# Patient Record
Sex: FEMALE | Race: BLACK OR AFRICAN AMERICAN | NOT HISPANIC OR LATINO | URBAN - METROPOLITAN AREA
[De-identification: names, ages, dates, MRNs, and addresses within clinical notes are randomized per-mention and may not be internally consistent; named-entity substitution may affect disease eponyms.]

---

## 2023-05-12 ENCOUNTER — SOCIAL WORK (OUTPATIENT)
Dept: ADMINISTRATIVE | Facility: OTHER | Age: 18
End: 2023-05-12

## 2023-05-12 NOTE — PROGRESS NOTES
SW met with pt regarding initial OB assessment. Pt stated this is her 1st pregnancy/0-miscarriage. Pt stated lives with her mother-in-law and able to perform ADL's independently. Pt stated does work. Pt stated has medicaid([facility]). Pt stated does not have WIC. Pt stated is going to breastfeed. SW provide pt with information on other community resources.SW faxed and scanned pt's notification of pregnancy into epic.  No other needs identified at this time.    Aleksandra Correa,MSW  Pager#[phone]

## 2023-06-21 ENCOUNTER — SOCIAL WORK (OUTPATIENT)
Dept: ADMINISTRATIVE | Facility: OTHER | Age: 18
End: 2023-06-21

## 2023-06-21 PROBLEM — F41.9 ANXIETY: Status: ACTIVE | Noted: 2023-06-21

## 2023-06-21 PROBLEM — Z72.0 TOBACCO USE: Status: ACTIVE | Noted: 2023-06-21

## 2023-06-21 NOTE — PROGRESS NOTES
SW met with pt regarding housing issues. SW provide pt with community resources on housing. Pt verbalize understanding. No other needs identified at this time.    Aleksandra Correa,MSW  Pager#[phone]

## 2023-08-23 PROBLEM — R21 RASH: Status: ACTIVE | Noted: 2023-08-23

## 2023-09-04 PROBLEM — O99.340 ANXIETY DURING PREGNANCY: Status: ACTIVE | Noted: 2023-09-04

## 2023-09-04 PROBLEM — Z34.92 SUPERVISION OF LOW-RISK PREGNANCY, SECOND TRIMESTER: Status: ACTIVE | Noted: 2023-09-04

## 2023-09-04 PROBLEM — O99.332 TOBACCO SMOKING AFFECTING PREGNANCY IN SECOND TRIMESTER: Status: ACTIVE | Noted: 2023-09-04

## 2023-09-04 PROBLEM — F41.9 ANXIETY DURING PREGNANCY: Status: ACTIVE | Noted: 2023-09-04

## 2023-09-12 ENCOUNTER — PATIENT MESSAGE (OUTPATIENT)
Dept: ADMINISTRATIVE | Facility: OTHER | Age: 18
End: 2023-09-12

## 2023-10-03 PROBLEM — R11.2 NAUSEA & VOMITING: Status: ACTIVE | Noted: 2023-10-03

## 2023-10-03 PROBLEM — Z34.92 SUPERVISION OF LOW-RISK PREGNANCY, SECOND TRIMESTER: Status: RESOLVED | Noted: 2023-09-04 | Resolved: 2023-10-03

## 2023-10-03 PROBLEM — Z34.03 SUPERVISION OF LOW-RISK FIRST PREGNANCY, THIRD TRIMESTER: Status: ACTIVE | Noted: 2023-10-03

## 2023-10-03 PROBLEM — O21.9 NAUSEA AND VOMITING OF PREGNANCY, ANTEPARTUM: Status: ACTIVE | Noted: 2023-10-03

## 2023-10-04 PROBLEM — O21.9 NAUSEA AND VOMITING OF PREGNANCY, ANTEPARTUM: Status: RESOLVED | Noted: 2023-10-03 | Resolved: 2023-10-04

## 2023-10-16 PROBLEM — R11.2 NAUSEA & VOMITING: Status: RESOLVED | Noted: 2023-10-03 | Resolved: 2023-10-16

## 2023-10-24 PROBLEM — R10.9 ABDOMINAL PAIN AFFECTING PREGNANCY: Status: ACTIVE | Noted: 2023-10-24

## 2023-10-24 PROBLEM — J45.909 ASTHMA: Status: ACTIVE | Noted: 2023-10-24

## 2023-10-24 PROBLEM — O26.899 ABDOMINAL PAIN AFFECTING PREGNANCY: Status: ACTIVE | Noted: 2023-10-24

## 2023-10-31 PROBLEM — R10.9 ABDOMINAL PAIN AFFECTING PREGNANCY: Status: RESOLVED | Noted: 2023-10-24 | Resolved: 2023-10-31

## 2023-10-31 PROBLEM — O26.899 ABDOMINAL PAIN AFFECTING PREGNANCY: Status: RESOLVED | Noted: 2023-10-24 | Resolved: 2023-10-31

## 2023-11-01 PROBLEM — Z59.89 LIVING ACCOMMODATION ISSUES: Status: ACTIVE | Noted: 2023-11-01

## 2023-11-01 PROBLEM — M25.552 HIP PAIN, LEFT: Status: ACTIVE | Noted: 2023-11-01

## 2023-11-28 PROBLEM — N89.8 VAGINAL DISCHARGE DURING PREGNANCY IN THIRD TRIMESTER: Status: ACTIVE | Noted: 2023-11-28

## 2023-11-28 PROBLEM — O26.893 VAGINAL DISCHARGE DURING PREGNANCY IN THIRD TRIMESTER: Status: ACTIVE | Noted: 2023-11-28

## 2023-12-03 PROBLEM — R21 RASH: Status: RESOLVED | Noted: 2023-08-23 | Resolved: 2023-12-03

## 2023-12-03 PROBLEM — N89.8 VAGINAL DISCHARGE DURING PREGNANCY IN THIRD TRIMESTER: Status: RESOLVED | Noted: 2023-11-28 | Resolved: 2023-12-03

## 2023-12-03 PROBLEM — M25.552 HIP PAIN, LEFT: Status: RESOLVED | Noted: 2023-11-01 | Resolved: 2023-12-03

## 2023-12-03 PROBLEM — O26.893 VAGINAL DISCHARGE DURING PREGNANCY IN THIRD TRIMESTER: Status: RESOLVED | Noted: 2023-11-28 | Resolved: 2023-12-03

## 2023-12-03 PROBLEM — Z03.71 ENCOUNTER FOR SUSPECTED PREMATURE RUPTURE OF MEMBRANES, WITH RUPTURE OF MEMBRANES NOT FOUND: Status: ACTIVE | Noted: 2023-12-03

## 2023-12-21 PROBLEM — Z87.59 STATUS POST VACUUM-ASSISTED VAGINAL DELIVERY: Status: ACTIVE | Noted: 2023-12-21

## 2024-03-04 PROBLEM — Z03.71 ENCOUNTER FOR SUSPECTED PREMATURE RUPTURE OF MEMBRANES, WITH RUPTURE OF MEMBRANES NOT FOUND: Status: RESOLVED | Noted: 2023-12-03 | Resolved: 2024-03-04
